# Patient Record
(demographics unavailable — no encounter records)

---

## 2025-01-27 NOTE — PHYSICAL EXAM
[Alert] : alert [Playful] : playful [Mucoid Discharge] : mucoid discharge [Erythematous Oropharynx] : erythematous oropharynx [NL] : moves all extremities x4, warm, well perfused x4 [Acute Distress] : no acute distress [Tired appearing] : not tired appearing [Lethargic] : not lethargic [FreeTextEntry4] : MILD NASAL CONGESTION

## 2025-01-27 NOTE — REVIEW OF SYSTEMS
[Fever] : fever [Fussy] : fussy [Ear Tugging] : ear tugging [Nasal Discharge] : nasal discharge [Nasal Congestion] : nasal congestion [Cough] : cough [Congestion] : congestion [Negative] : Heme/Lymph

## 2025-01-27 NOTE — PLAN
[TextEntry] : discussed the use of amoxicillin, antipyretics and analgesics as needed, good hand washing, refraining from sharing glasses or silverware, etc.; increased cold fluids (ice cream, yogurt, popsicles, etc.); discussed ways to avoid infection

## 2025-02-09 NOTE — DEVELOPMENTAL MILESTONES
[Yes: _______] : yes, [unfilled] [Plays alongside other children] : plays alongside other children [Scoops well with spoon] : scoops well with spoon [Follows 2-step command] : follows 2-step command [Kicks ball] : kicks ball  [Runs with coordination] : runs with coordination [Climbs up a ladder at a] : climbs up a ladder at a playground [Turns book pages] : turns book pages [Uses hands to turn objects] : uses hands to turn objects [Uses 50 words] : does not use 50 words [Combine 2 words into phrase or] : does not combine 2 words into phrase or sentences [FreeTextEntry1] : Loves playing with other kids Repeating more words starting to combine words.  25-50% understandable.

## 2025-02-09 NOTE — PHYSICAL EXAM
[Alert] : alert [No Acute Distress] : no acute distress [Normocephalic] : normocephalic [Anterior [place] Closed] : anterior fontanelle closed [Red Reflex Bilateral] : red reflex bilateral [PERRL] : PERRL [Normally Placed Ears] : normally placed ears [Auricles Well Formed] : auricles well formed [Clear Tympanic membranes with present light reflex and bony landmarks] : clear tympanic membranes with present light reflex and bony landmarks [No Discharge] : no discharge [Nares Patent] : nares patent [Palate Intact] : palate intact [Uvula Midline] : uvula midline [Tooth Eruption] : tooth eruption  [Supple, full passive range of motion] : supple, full passive range of motion [No Palpable Masses] : no palpable masses [Symmetric Chest Rise] : symmetric chest rise [Clear to Auscultation Bilaterally] : clear to auscultation bilaterally [Regular Rate and Rhythm] : regular rate and rhythm [S1, S2 present] : S1, S2 present [No Murmurs] : no murmurs [+2 Femoral Pulses] : +2 femoral pulses [Soft] : soft [NonTender] : non tender [Non Distended] : non distended [Normoactive Bowel Sounds] : normoactive bowel sounds [No Hepatomegaly] : no hepatomegaly [No Splenomegaly] : no splenomegaly [Central Urethral Opening] : central urethral opening [Testicles Descended Bilaterally] : testicles descended bilaterally [Patent] : patent [Normally Placed] : normally placed [No Abnormal Lymph Nodes Palpated] : no abnormal lymph nodes palpated [No Clavicular Crepitus] : no clavicular crepitus [Symmetric Buttocks Creases] : symmetric buttocks creases [No Spinal Dimple] : no spinal dimple [NoTuft of Hair] : no tuft of hair [Cranial Nerves Grossly Intact] : cranial nerves grossly intact [No Rash or Lesions] : no rash or lesions

## 2025-02-09 NOTE — HISTORY OF PRESENT ILLNESS
[Mother] : mother [Cow's milk (Ounces per day ___)] : consumes [unfilled] oz of Cow's milk per day [Normal] : Normal [Brushing teeth] : Brushing teeth [No] : Patient does not go to dentist yearly [Toothpaste] : Primary Fluoride Source: Toothpaste [Car seat in back seat] : Car seat in back seat [Smoke Detectors] : Smoke detectors [Carbon Monoxide Detectors] : Carbon monoxide detectors [de-identified] : Oatmeal, PB, fruits mixed in oatmeal, limited meat, some eggs. limited vegetables [FreeTextEntry9] : HOME

## 2025-02-09 NOTE — HISTORY OF PRESENT ILLNESS
[Mother] : mother [Cow's milk (Ounces per day ___)] : consumes [unfilled] oz of Cow's milk per day [Normal] : Normal [Brushing teeth] : Brushing teeth [No] : Patient does not go to dentist yearly [Toothpaste] : Primary Fluoride Source: Toothpaste [Car seat in back seat] : Car seat in back seat [Smoke Detectors] : Smoke detectors [Carbon Monoxide Detectors] : Carbon monoxide detectors [de-identified] : Oatmeal, PB, fruits mixed in oatmeal, limited meat, some eggs. limited vegetables [FreeTextEntry9] : HOME

## 2025-02-09 NOTE — PHYSICAL EXAM
[Alert] : alert [No Acute Distress] : no acute distress [Normocephalic] : normocephalic [Anterior [place] Closed] : anterior fontanelle closed [Red Reflex Bilateral] : red reflex bilateral [PERRL] : PERRL [Normally Placed Ears] : normally placed ears [Auricles Well Formed] : auricles well formed [Clear Tympanic membranes with present light reflex and bony landmarks] : clear tympanic membranes with present light reflex and bony landmarks [No Discharge] : no discharge [Nares Patent] : nares patent [Palate Intact] : palate intact [Tooth Eruption] : tooth eruption  [Uvula Midline] : uvula midline [Supple, full passive range of motion] : supple, full passive range of motion [No Palpable Masses] : no palpable masses [Symmetric Chest Rise] : symmetric chest rise [Clear to Auscultation Bilaterally] : clear to auscultation bilaterally [Regular Rate and Rhythm] : regular rate and rhythm [S1, S2 present] : S1, S2 present [+2 Femoral Pulses] : +2 femoral pulses [No Murmurs] : no murmurs [Soft] : soft [NonTender] : non tender [Non Distended] : non distended [Normoactive Bowel Sounds] : normoactive bowel sounds [No Hepatomegaly] : no hepatomegaly [No Splenomegaly] : no splenomegaly [Central Urethral Opening] : central urethral opening [Testicles Descended Bilaterally] : testicles descended bilaterally [Patent] : patent [Normally Placed] : normally placed [No Abnormal Lymph Nodes Palpated] : no abnormal lymph nodes palpated [No Clavicular Crepitus] : no clavicular crepitus [Symmetric Buttocks Creases] : symmetric buttocks creases [No Spinal Dimple] : no spinal dimple [NoTuft of Hair] : no tuft of hair [Cranial Nerves Grossly Intact] : cranial nerves grossly intact [No Rash or Lesions] : no rash or lesions

## 2025-02-09 NOTE — DISCUSSION/SUMMARY
[FreeTextEntry1] :  3 yo boy here for [facility].   Expressive Speech delay - EI Referral  [facility] - Continue offering balanced diet including fruits/vegetables and drinking mostly water - Brush teeth twice a day with soft toothbrush. Recommend visit to dentist. - Put toddler to sleep in own bed. Help toddler to maintain consistent daily routines and sleep schedule. - Read aloud to Toddler. - Limit screen time to max 1-2 hours per day.  - vaccines given today: Hep A - Hb, Lead & Amblyopia screen wnl - Return for 30 month C

## 2025-02-09 NOTE — DISCUSSION/SUMMARY
[FreeTextEntry1] :  3 yo boy here for [facility].   Expressive Speech delay - EI Referral  [facility] - Continue offering balanced diet including fruits/vegetables and drinking mostly water - Brush teeth twice a day with soft toothbrush. Recommend visit to dentist. - Put toddler to sleep in own bed. Help toddler to maintain consistent daily routines and sleep schedule. - Read aloud to Toddler. - Limit screen time to max 1-2 hours per day.  - vaccines given today: Hep A - Hb, Lead & Amblyopia screen wnl - Return for 30 month C